# Patient Record
Sex: FEMALE | Race: OTHER | HISPANIC OR LATINO | URBAN - METROPOLITAN AREA
[De-identification: names, ages, dates, MRNs, and addresses within clinical notes are randomized per-mention and may not be internally consistent; named-entity substitution may affect disease eponyms.]

---

## 2024-09-22 ENCOUNTER — EMERGENCY (EMERGENCY)
Facility: HOSPITAL | Age: 39
LOS: 1 days | Discharge: STILL ADMITTED OR ADMINISTRATIVE | End: 2024-09-22
Attending: EMERGENCY MEDICINE | Admitting: EMERGENCY MEDICINE
Payer: MEDICAID

## 2024-09-22 ENCOUNTER — OUTPATIENT (OUTPATIENT)
Dept: INPATIENT UNIT | Facility: HOSPITAL | Age: 39
LOS: 1 days | Discharge: HOME | End: 2024-09-22
Payer: MEDICAID

## 2024-09-22 ENCOUNTER — EMERGENCY (EMERGENCY)
Facility: HOSPITAL | Age: 39
LOS: 1 days | Discharge: HOME | End: 2024-09-22
Attending: EMERGENCY MEDICINE | Admitting: EMERGENCY MEDICINE
Payer: MEDICAID

## 2024-09-22 VITALS
RESPIRATION RATE: 16 BRPM | DIASTOLIC BLOOD PRESSURE: 70 MMHG | TEMPERATURE: 99 F | SYSTOLIC BLOOD PRESSURE: 110 MMHG | HEIGHT: 63 IN | WEIGHT: 160.94 LBS | HEART RATE: 67 BPM | OXYGEN SATURATION: 100 %

## 2024-09-22 VITALS
DIASTOLIC BLOOD PRESSURE: 70 MMHG | TEMPERATURE: 97 F | RESPIRATION RATE: 16 BRPM | OXYGEN SATURATION: 99 % | HEART RATE: 73 BPM | SYSTOLIC BLOOD PRESSURE: 107 MMHG

## 2024-09-22 VITALS — SYSTOLIC BLOOD PRESSURE: 99 MMHG | HEART RATE: 57 BPM | DIASTOLIC BLOOD PRESSURE: 63 MMHG

## 2024-09-22 VITALS
TEMPERATURE: 99 F | RESPIRATION RATE: 16 BRPM | DIASTOLIC BLOOD PRESSURE: 56 MMHG | HEART RATE: 61 BPM | SYSTOLIC BLOOD PRESSURE: 105 MMHG

## 2024-09-22 VITALS
SYSTOLIC BLOOD PRESSURE: 107 MMHG | TEMPERATURE: 98 F | OXYGEN SATURATION: 99 % | HEART RATE: 74 BPM | RESPIRATION RATE: 16 BRPM | DIASTOLIC BLOOD PRESSURE: 71 MMHG

## 2024-09-22 DIAGNOSIS — O26.899 OTHER SPECIFIED PREGNANCY RELATED CONDITIONS, UNSPECIFIED TRIMESTER: ICD-10-CM

## 2024-09-22 LAB
ALBUMIN SERPL ELPH-MCNC: 3.3 G/DL — SIGNIFICANT CHANGE UP (ref 3.3–5)
ALP SERPL-CCNC: 67 U/L — SIGNIFICANT CHANGE UP (ref 40–120)
ALT FLD-CCNC: 21 U/L — SIGNIFICANT CHANGE UP (ref 4–33)
ANION GAP SERPL CALC-SCNC: 11 MMOL/L — SIGNIFICANT CHANGE UP (ref 7–14)
APPEARANCE UR: CLEAR — SIGNIFICANT CHANGE UP
AST SERPL-CCNC: 20 U/L — SIGNIFICANT CHANGE UP (ref 4–32)
BASOPHILS # BLD AUTO: 0.02 K/UL — SIGNIFICANT CHANGE UP (ref 0–0.2)
BASOPHILS NFR BLD AUTO: 0.2 % — SIGNIFICANT CHANGE UP (ref 0–2)
BILIRUB SERPL-MCNC: 0.2 MG/DL — SIGNIFICANT CHANGE UP (ref 0.2–1.2)
BILIRUB UR-MCNC: NEGATIVE — SIGNIFICANT CHANGE UP
BUN SERPL-MCNC: 11 MG/DL — SIGNIFICANT CHANGE UP (ref 7–23)
CALCIUM SERPL-MCNC: 8.9 MG/DL — SIGNIFICANT CHANGE UP (ref 8.4–10.5)
CHLORIDE SERPL-SCNC: 108 MMOL/L — HIGH (ref 98–107)
CO2 SERPL-SCNC: 21 MMOL/L — LOW (ref 22–31)
COLOR SPEC: YELLOW — SIGNIFICANT CHANGE UP
CREAT SERPL-MCNC: 0.5 MG/DL — SIGNIFICANT CHANGE UP (ref 0.5–1.3)
DIFF PNL FLD: NEGATIVE — SIGNIFICANT CHANGE UP
EGFR: 122 ML/MIN/1.73M2 — SIGNIFICANT CHANGE UP
EOSINOPHIL # BLD AUTO: 0.08 K/UL — SIGNIFICANT CHANGE UP (ref 0–0.5)
EOSINOPHIL NFR BLD AUTO: 1 % — SIGNIFICANT CHANGE UP (ref 0–6)
GAS PNL BLDV: SIGNIFICANT CHANGE UP
GLUCOSE SERPL-MCNC: 85 MG/DL — SIGNIFICANT CHANGE UP (ref 70–99)
GLUCOSE UR QL: NEGATIVE MG/DL — SIGNIFICANT CHANGE UP
HCT VFR BLD CALC: 34.7 % — SIGNIFICANT CHANGE UP (ref 34.5–45)
HGB BLD-MCNC: 11.4 G/DL — LOW (ref 11.5–15.5)
IANC: 5.58 K/UL — SIGNIFICANT CHANGE UP (ref 1.8–7.4)
IMM GRANULOCYTES NFR BLD AUTO: 0.4 % — SIGNIFICANT CHANGE UP (ref 0–0.9)
KETONES UR-MCNC: NEGATIVE MG/DL — SIGNIFICANT CHANGE UP
LDH SERPL L TO P-CCNC: 167 U/L — SIGNIFICANT CHANGE UP (ref 135–225)
LEUKOCYTE ESTERASE UR-ACNC: NEGATIVE — SIGNIFICANT CHANGE UP
LYMPHOCYTES # BLD AUTO: 1.8 K/UL — SIGNIFICANT CHANGE UP (ref 1–3.3)
LYMPHOCYTES # BLD AUTO: 22.4 % — SIGNIFICANT CHANGE UP (ref 13–44)
MCHC RBC-ENTMCNC: 29.8 PG — SIGNIFICANT CHANGE UP (ref 27–34)
MCHC RBC-ENTMCNC: 32.9 GM/DL — SIGNIFICANT CHANGE UP (ref 32–36)
MCV RBC AUTO: 90.6 FL — SIGNIFICANT CHANGE UP (ref 80–100)
MONOCYTES # BLD AUTO: 0.54 K/UL — SIGNIFICANT CHANGE UP (ref 0–0.9)
MONOCYTES NFR BLD AUTO: 6.7 % — SIGNIFICANT CHANGE UP (ref 2–14)
NEUTROPHILS # BLD AUTO: 5.58 K/UL — SIGNIFICANT CHANGE UP (ref 1.8–7.4)
NEUTROPHILS NFR BLD AUTO: 69.3 % — SIGNIFICANT CHANGE UP (ref 43–77)
NITRITE UR-MCNC: NEGATIVE — SIGNIFICANT CHANGE UP
NRBC # BLD: 0 /100 WBCS — SIGNIFICANT CHANGE UP (ref 0–0)
NRBC # FLD: 0 K/UL — SIGNIFICANT CHANGE UP (ref 0–0)
PH UR: 6.5 — SIGNIFICANT CHANGE UP (ref 5–8)
PLATELET # BLD AUTO: 209 K/UL — SIGNIFICANT CHANGE UP (ref 150–400)
POTASSIUM SERPL-MCNC: 4.3 MMOL/L — SIGNIFICANT CHANGE UP (ref 3.5–5.3)
POTASSIUM SERPL-SCNC: 4.3 MMOL/L — SIGNIFICANT CHANGE UP (ref 3.5–5.3)
PROT SERPL-MCNC: 6.2 G/DL — SIGNIFICANT CHANGE UP (ref 6–8.3)
PROT UR-MCNC: NEGATIVE MG/DL — SIGNIFICANT CHANGE UP
RBC # BLD: 3.83 M/UL — SIGNIFICANT CHANGE UP (ref 3.8–5.2)
RBC # FLD: 13.8 % — SIGNIFICANT CHANGE UP (ref 10.3–14.5)
SODIUM SERPL-SCNC: 140 MMOL/L — SIGNIFICANT CHANGE UP (ref 135–145)
SP GR SPEC: 1.01 — SIGNIFICANT CHANGE UP (ref 1–1.03)
UROBILINOGEN FLD QL: 0.2 MG/DL — SIGNIFICANT CHANGE UP (ref 0.2–1)
WBC # BLD: 8.05 K/UL — SIGNIFICANT CHANGE UP (ref 3.8–10.5)
WBC # FLD AUTO: 8.05 K/UL — SIGNIFICANT CHANGE UP (ref 3.8–10.5)

## 2024-09-22 PROCEDURE — 93010 ELECTROCARDIOGRAM REPORT: CPT

## 2024-09-22 PROCEDURE — 99284 EMERGENCY DEPT VISIT MOD MDM: CPT

## 2024-09-22 PROCEDURE — L9997: CPT

## 2024-09-22 PROCEDURE — 99221 1ST HOSP IP/OBS SF/LOW 40: CPT

## 2024-09-22 RX ORDER — ASPIRIN 325 MG
0 TABLET ORAL
Refills: 0 | DISCHARGE

## 2024-09-22 RX ORDER — ACETAMINOPHEN 325 MG/1
1000 TABLET ORAL ONCE
Refills: 0 | Status: COMPLETED | OUTPATIENT
Start: 2024-09-22 | End: 2024-09-22

## 2024-09-22 RX ORDER — METOCLOPRAMIDE HCL 5 MG
10 TABLET ORAL ONCE
Refills: 0 | Status: COMPLETED | OUTPATIENT
Start: 2024-09-22 | End: 2024-09-22

## 2024-09-22 RX ORDER — PRENATAL VIT,CAL 76/IRON/FOLIC 29 MG-1 MG
1 TABLET ORAL
Refills: 0 | DISCHARGE

## 2024-09-22 RX ADMIN — ACETAMINOPHEN 400 MILLIGRAM(S): 325 TABLET ORAL at 19:12

## 2024-09-22 RX ADMIN — Medication 10 MILLIGRAM(S): at 19:12

## 2024-09-22 NOTE — OB PROVIDER TRIAGE NOTE - NSICDXNOPASTMEDICALHX_GEN_ALL_CORE
Psych/Behavioral
<-- Click to add NO pertinent Past Medical History
varicella immune  measles immune  mumps immune    4/27/20  HIV NR  RPR NR    Sonogram:  7/29/20, EGA 22.4, posterior placenta, normal anatomy, aga, prominent bowel loop measuring 0.6cm, no peristalsis seen, suggest f/u in 2 weeks if not resolved for fetal echo  8/20/20, EGA 25.5, EFW 51%, bowel appears echogenic in fetal pelvis with several prominent loops, nl afv, suggest C.F., NIPS, repeat scan, genetics consult, pt aware, for echo (patient refused testing due to Rabbi saying to hold off on tests)

## 2024-09-22 NOTE — OB RN TRIAGE NOTE - CHIEF COMPLAINT QUOTE
"I have red eye, headache, swelling in the feet from Thursday, was told to take Tylenol every 4 hours and I took one 500 mg tablet from at 3 am but scared to harm baby , so came to ED and they gave me Tylenol and fluids and headache is gone, I have nasal congestion today and had flu symptoms two week ago, tested negative a week ago"

## 2024-09-22 NOTE — OB PROVIDER TRIAGE NOTE - HISTORY OF PRESENT ILLNESS
40 y/o  EGA 19+ weeks came to ED due to HA and left eye being bloody.  No c/o of ab pain, bleeding or LOF; +Good FM    While in ED, pt was examined and found to have some ab pain.  ED called L and D and decision was made to evaluate pt obstetrically.    PNC at [facility] comp by AMA; Hx of C/S; Fibroid x 1    Pt received tylenol while in ED and feels fine now.  HA has resolved but eye is still red.

## 2024-09-22 NOTE — OB PROVIDER TRIAGE NOTE - NSHPPHYSICALEXAM_GEN_ALL_CORE
VSS, afebrile    Ab - soft/nt/gravid  Cervix - L/Cl/P, no bleeding  Ext - Mild pedal edema bilaterally; 1++ VSS, afebrile    Ab - soft/nt/gravid  Cervix - L/Cl/P, no bleeding  Ext - No C/C/E, no calf pain

## 2024-09-22 NOTE — ED PROVIDER NOTE - NSFOLLOWUPINSTRUCTIONS_ED_ALL_ED_FT
You were seen in the emergency department for headache and nausea vomiting.  You had blood work, and you were given medication for the headache.  You are also evaluated by the OB team.  A copy of all available results are included in your discharge paperwork.  At home you may take Tylenol with 650 mg every 6-8 hours as needed for pain.  Please continue your other medication at home.  Please follow-up with your OB/GYN in the next 48 hours.  Please return to the ED if you have any return of headache, vomiting, blurry vision or change in vision, dizziness or other concerns.    Lo atendieron en urgencias por dolor de elvia y náuseas y vómitos.  Le hicieron análisis de angel luis y le dieron medicamentos para el dolor de elvia.  También serás evaluado por el equipo de obstetricia.  Se incluye dov copia de todos los resultados disponibles en la documentación de chandler andrei.  En casa puede patricia Tylenol con 650 mg cada 6 a 8 horas según sea necesario para el dolor.  Continúe con laquita otros medicamentos en casa.  Chuck un seguimiento con chandler obstetra/ginecólogo en las próximas 48 horas.  Regrese al servicio de urgencias si vuelve a tener dolor de elvia, vómitos, visión borrosa o cambios en la visión, mareos u otras preocupaciones. You were seen in the emergency department for headache and nausea vomiting.  You had blood work, and you were given medication for the headache.  You are choosing to leave prior to having your MRI. There is a concern for bleeding and blood clot that could be dangerous to you health.   You are also evaluated by the OB team.  A copy of all available results are included in your discharge paperwork.  At home you may take Tylenol with 650 mg every 6-8 hours as needed for pain.  Please continue your other medication at home.  Please follow-up with your OB/GYN in the next 48 hours.  Please return to the ED if you have any return of headache, vomiting, blurry vision or change in vision, dizziness or other concerns.    Lo atendieron en el departamento de emergencias por dolor de elvia, náuseas y vómitos. Le hicieron análisis de angel luis y le dieron medicamentos para el dolor de elvia. Usted decidió irse antes de hacerse la resonancia magnética. Existe la preocupación de que se produzcan sangrados y coágulos de angel luis que podrían ser peligrosos para chandler palmira. También lo evaluará el equipo de obstetricia. Se incluye dov copia de todos los resultados disponibles en la documentación del andrei. En chandler casa, puede patricia Tylenol de 650 mg cada 6 a 8 horas según sea necesario para el dolor. Continúe tomando el indio de chandler medicación en casa. Realice un seguimiento con chandler obstetra/ginecólogo en las próximas 48 horas. Regrese al departamento de emergencias si vuelve a tener dolor de elvia, vómitos, visión borrosa o cambios en la visión, mareos u otras inquietudes.

## 2024-09-22 NOTE — ED PROVIDER NOTE - OBJECTIVE STATEMENT
39-year-old female G2 at 19 weeks 6 days gestation with history of preeclampsia on aspirin now.  Patient presented to the ED earlier today with left-sided headache 9 out of 10 and subconjunctival hemorrhage.  Patient also complained of nausea with few episodes of vomiting that started yesterday.  Patient denies any blurry vision.  However headache was just behind the left eye.  No pain with eye movement.  Patient seen in the ED few hours ago and there is concern for And is seen venous sinus thrombosis given patient had complained of left facial edema earlier that had since resolved.  Patient was sent to L&D triage for evaluation of the fetus and now returns to the ED.  Patient had been given Reglan for headache prior to transport and now states headache is 0 out of 10 and completely resolved.  Patient also states nausea is not present and is now tolerating p.o.

## 2024-09-22 NOTE — ED PROVIDER NOTE - OBJECTIVE STATEMENT
39-year-old F patient currently 19 weeks 6 days pregnant, G2, P1, with history preeclampsia, uterine fibroids presenting to the ED for evaluation of headache and abdominal pain x 4 days.  Patient describes headache that began 4 days ago, left-sided frontal pain behind the eye, initially intermittent however now constant for the past 2 days, severe, 8/10 in intensity.  Patient notes 3 days ago she woke up 1 morning with left eye subconjunctival hemorrhage, atraumatic.  She also notes 1 episode of blurred vision over the past few days that resolved on its own.  Patient reports several days of lower abdominal pain and pressure, intermittent, also nausea and 2-3 episodes of vomiting (pt states she has not had frequent N/V during this pregnancy).  Patient reports approximately 1 week of swelling to her bilateral hands and feet, she also notes having facial swelling earlier this week involving whole face that has since resolved.  LMP May 6, patient follows with OB/GYN Dr. Rosina Polk, last ultrasound 2 weeks ago was normal.  Patient takes prenatal vitamins and a baby aspirin for history of preeclampsia.  Last Tylenol dose with dosage was yesterday.  Denies eye pain, current blurred vision, diarrhea, LOC, numbness, tingling, dysuria, hematuria, urinary frequency, vaginal bleeding, chest pain, shortness of breath.  Pt's primary language is not english, official medical translation services were offered however pt preferred to communicate in english and with the help of friend present at the bedside to aid in translation.

## 2024-09-22 NOTE — ED PROVIDER NOTE - PROGRESS NOTE DETAILS
Kevin KINGSTON), PGY-2: Spoke with radiology resident regarding recommendations for head imaging of this patient, given headache with subconjunctival injection with episode of blurred vision and episode of facial swelling, there is concern for cavernous sinus thrombosis, patient at increased risk given pregnancy, radiology recommended MR venogram brain and orbits as the best recommended test for this patient. Kevin KINGSTON), PGY-2: Spoke with OB/GYN team, discussed patient symptoms and clinical presentation, they recommend patient be transferred to L&D triage for continued evaluation and management.

## 2024-09-22 NOTE — ED ADULT TRIAGE NOTE - CHIEF COMPLAINT QUOTE
Patient was sent from L & D for MRI. Was seen earlier in ED and sent to be cleared in L & D. Patient is 20 weeks pregnant, SURINDER 2/10/2025. Redness noted of lateral sclera of left eye. No medical problems. Appears well.

## 2024-09-22 NOTE — ED PROVIDER NOTE - ATTENDING CONTRIBUTION TO CARE
Attending note:   After face to face evaluation of this patient, I concur with above noted hx, pe, and care plan for this patient.  Romo: 39-year-old female with past medical history of preeclampsia in previous pregnancy.  Patient is a G2, P2 at 19 weeks and 6 days presenting to the ED now with 3 days of left-sided headache.  Patient also had facial swelling that slightly improved but 2 days ago also noticed bleeding in the left eye.  Patient states she had an episode of blurry vision but that has since resolved.  Patient is also been having some lower abdominal pain and nausea vomiting that is new in this pregnancy.  Patient is on baby aspirin given previous history of preeclampsia.  Patient also notes that she had swelling in the face and legs and hands over the last week and hand swelling is improved but  leg swelling r subconjunctival hemorrhage.  No significant facial asymmetry is noted.  Extraocular movements are intact.  .  On exam there is a left lateral eye subconjunctival there is no proptosis noted.  There is some minimal edema of the bilateral lower extremities but no edema is noted of the hands.  Lower abdominal exam however patient does appear very uncomfortable when lower abdomen is palpated.  Patient does have a history of fibroids.  There is no rebound or guarding.  There is no CVA tenderness.  Heart is regular and lungs are clear.  There is no tenderness in the right upper quadrant or epigastric areas.  Neck is soft supple full range of motion rest of neurologic exam is unremarkable.  Patient's speech is clear and motor and sensation are normal.  Patient appears to have multiple issues.  For left-sided headache and pain behind the left eye that is currently a 9 out of 10 with left-sided conjunctival hemorrhage and facial swelling that is since improved there is concern for venous sinus thrombosis.  Given patient's pregnancy and MRI would be the best test to get.  Will give Reglan and IV Tylenol for headache in the meanwhile.  Will discuss with radiology prior to ordering the test as unsure of which protocol should be ordered.  Lower abdominal pain patient had a transabdominal ultrasound which showed live IUP with good fetal movement and no free fluid.  And given diffuse nature of lower abdomen unsure of etiology of patient's lower abdominal symptoms.  Will also get UA.  Will check LFTs and lipase as well given new nausea and vomiting.  Hopefully Reglan will help with those symptoms as well.  Will also check albumin and kidney function given patient's lower extremity edema.  The edema is minimal and there is no calf tenderness or pitting and unlikely to be DVT.  The case was discussed with GYN resident and Dr. Caal.  Concern for This Venous Sinus Thrombosis and Patient's Abdominal Pain Was Discussed and Given the Abdominal Pain Patient to Be Transferred to L&D and We Will Follow-Up on Patient's Headache.  Patient Got Pain Medication in ED and Labs Were Ordered and Sent.  GYN Team to Follow-Up on Results.

## 2024-09-22 NOTE — ED PROVIDER NOTE - PHYSICAL EXAMINATION
Extraocular movements are normal.  Pupils are equal and reactive.  There is a left lateral subconjunctival hemorrhage.  No chemosis is noted.  There is no facial tenderness and no significant asymmetry is noted.  Oropharynx is normal.

## 2024-09-22 NOTE — ED ADULT TRIAGE NOTE - CHIEF COMPLAINT QUOTE
Pt arrives ambulatory,  at 19 weeks gestation  c/o headache, facial swelling and b/l lower extremity swelling that started 3 days ago. Redness noted on L side of eye. Reports fetal movement. Pt denies any vaginal bleeding/leaking of fluid,  chest pain, dizziness, sob or blurry vision. PMHx preeclampsia in 1st pregnancy. Per L&D triage, pt to be seen in the ED.

## 2024-09-22 NOTE — ED PROVIDER NOTE - AOU DETAILS
Patient presented to ED with 9 out of 10 left-sided headache and lower abdominal pain.  On exam patient has subconjunctival hemorrhage, minimal lower extremity edema and lower abdominal tenderness.  Case was discussed with GYN resident who would requested transfer to L&D and they will follow-up on lab results and order MRI once patient is evaluated.

## 2024-09-22 NOTE — OB PROVIDER TRIAGE NOTE - NSOBPROVIDERNOTE_OBGYN_ALL_OB_FT
A/P Pt with IUP at 19+ weeks.  Pt with no evidence of  labor.  Pt in stable condition.      Will:    1) Pt cleared from an obstetrical point of view  2) Pt to return back to ED to get MRI of head to R/O thrombosis - Dr. Romo aware  3) PTL precautions  4) Pt to keep PNV appt for later this week    OTIS Cabello

## 2024-09-22 NOTE — OB RN TRIAGE NOTE - NSICDXPASTSURGICALHX_GEN_ALL_CORE_FT
How Severe Is Your Skin Lesion?: mild Have Your Skin Lesions Been Treated?: not been treated Is This A New Presentation, Or A Follow-Up?: Skin Lesions PAST SURGICAL HISTORY:  No significant past surgical history

## 2024-09-22 NOTE — OB PROVIDER TRIAGE NOTE - NSHPLABSRESULTS_GEN_ALL_CORE
No CTX on toco    Urinalysis Basic - ( 22 Sep 2024 20:17 )    Color: Yellow / Appearance: Clear / S.014 / pH: x  Gluc: x / Ketone: Negative mg/dL  / Bili: Negative / Urobili: 0.2 mg/dL   Blood: x / Protein: Negative mg/dL / Nitrite: Negative   Leuk Esterase: Negative / RBC: x / WBC x   Sq Epi: x / Non Sq Epi: x / Bacteria: x      Sono: Variable lie; +FH; +FM  Cx - 3.86 cm

## 2024-09-22 NOTE — ED PROVIDER NOTE - CLINICAL SUMMARY MEDICAL DECISION MAKING FREE TEXT BOX
39-year-old female with left-sided headache and subconjunctival hemorrhage.  Patient's symptoms were initially concerning for pregnancy and cyanosis or other intracranial pathology.  And around after returning from L&D triage and MRI was ordered.  However patient does not wish to stay for MRI.  Patient states that headache is 0-10.  Voluntarily stated that she will come back tomorrow if symptoms recur.  Patient understands that there is concern for possible bleed or clot that could be dangerous to her health.

## 2024-09-22 NOTE — ED PROVIDER NOTE - PHYSICAL EXAMINATION
Const: Awake, alert, no acute distress.  Well appearing.  Moving comfortably on stretcher.  HEENT: NC/AT.  Moist mucous membranes.  No pharyngeal erythema, no exudates.  Eyes: Extraocular movements intact b/l.  Pupils equal, round, and reactive to light b/l.  (+) L-sided subconjunctival hemorrhage.  No pain with EOMI.  Neck: Neck supple, full ROM without pain.  Cardiac: Regular rate and regular rhythm. S1 S2 present.  Peripheral pulses 2+ and symmetric.  No LE edema.  No chest wall tenderness, no overlying skin changes.  Resp: Speaking in full sentences. No evidence of respiratory distress.  Good air entry b/l, breath sounds clear to auscultation b/l.  Abd: no overlying skin changes.  Soft, mild lower abdominal tenderness, no guarding, no rigidity, no rebound tenderness.  No palpable masses.  : fetal  on bedside POCUS.  MSK: Spine midline and non-tender, no paraspinal tenderness, no palpable deformities or overlying skin changes or open wounds. No CVAT.  Skin: Normal coloration.  No rashes, abrasions or lacerations.  Neuro: Awake, alert & oriented x 3.  Moves all extremities spontaneously and symmetrically.  No focal deficits. normal strength and sensation to b/l UEs and LEs.

## 2024-09-22 NOTE — ED PROVIDER NOTE - PATIENT PORTAL LINK FT
You can access the FollowMyHealth Patient Portal offered by [facility] by registering at the following website: [email or web address]. By joining [email or web address]’s FollowMyHealth portal, you will also be able to view your health information using other applications (apps) compatible with our system.

## 2024-09-22 NOTE — ED ADULT NURSE NOTE - OBJECTIVE STATEMENT
pt received to intake. pt is AxO 3 and ambulatory.  at bedside to translate. pt c/o headaches, with facial swelling and bilateral lower leg swelling x 3 days ago. pt noted to have redness to the left eye. pt is  at 19 weeks gestation. Pt denies vaginal discharge/bleeding. pt endorses hx of pre eclampsia in the first pregnancy. pt respirations even and unlabored. pt abdomen is soft, tender tot he lower abdomen with guarding noted, and nondistended. pt denies dizziness, blurred vision, chest pain, SOB, or fever like symptoms. Pt has a 20g to the left AC. Pt labs obtained and sen tot he lab. pt medicated as prescribed. pt report given to L&D triage RN.

## 2024-09-24 DIAGNOSIS — R51.9 HEADACHE, UNSPECIFIED: ICD-10-CM

## 2024-09-24 DIAGNOSIS — R10.9 UNSPECIFIED ABDOMINAL PAIN: ICD-10-CM

## 2024-09-24 DIAGNOSIS — D21.9 BENIGN NEOPLASM OF CONNECTIVE AND OTHER SOFT TISSUE, UNSPECIFIED: ICD-10-CM

## 2024-09-24 DIAGNOSIS — O99.891 OTHER SPECIFIED DISEASES AND CONDITIONS COMPLICATING PREGNANCY: ICD-10-CM

## 2024-09-24 DIAGNOSIS — Z3A.19 19 WEEKS GESTATION OF PREGNANCY: ICD-10-CM

## 2024-09-24 DIAGNOSIS — O09.522 SUPERVISION OF ELDERLY MULTIGRAVIDA, SECOND TRIMESTER: ICD-10-CM

## 2024-09-24 DIAGNOSIS — O26.892 OTHER SPECIFIED PREGNANCY RELATED CONDITIONS, SECOND TRIMESTER: ICD-10-CM

## 2024-09-29 PROCEDURE — 76815 OB US LIMITED FETUS(S): CPT | Mod: 26
